# Patient Record
Sex: FEMALE | Race: WHITE | NOT HISPANIC OR LATINO | URBAN - METROPOLITAN AREA
[De-identification: names, ages, dates, MRNs, and addresses within clinical notes are randomized per-mention and may not be internally consistent; named-entity substitution may affect disease eponyms.]

---

## 2022-03-16 ENCOUNTER — APPOINTMENT (RX ONLY)
Dept: URBAN - METROPOLITAN AREA CLINIC 28 | Facility: CLINIC | Age: 37
Setting detail: DERMATOLOGY
End: 2022-03-16

## 2022-03-16 DIAGNOSIS — L40.0 PSORIASIS VULGARIS: ICD-10-CM | Status: WORSENING

## 2022-03-16 PROCEDURE — ? PRESCRIPTION MEDICATION MANAGEMENT

## 2022-03-16 PROCEDURE — ? PRESCRIPTION

## 2022-03-16 PROCEDURE — 99204 OFFICE O/P NEW MOD 45 MIN: CPT

## 2022-03-16 PROCEDURE — ? COUNSELING

## 2022-03-16 PROCEDURE — ? ADDITIONAL NOTES

## 2022-03-16 RX ORDER — HYDROCORTISONE 25 MG/G
CREAM TOPICAL BID
Qty: 30 | Refills: 3 | Status: ERX | COMMUNITY
Start: 2022-03-16

## 2022-03-16 RX ORDER — TRIAMCINOLONE ACETONIDE 1 MG/G
CREAM TOPICAL BID
Qty: 454 | Refills: 3 | Status: ERX | COMMUNITY
Start: 2022-03-16

## 2022-03-16 RX ADMIN — TRIAMCINOLONE ACETONIDE: 1 CREAM TOPICAL at 00:00

## 2022-03-16 RX ADMIN — HYDROCORTISONE: 25 CREAM TOPICAL at 00:00

## 2022-03-16 ASSESSMENT — LOCATION ZONE DERM
LOCATION ZONE: ARM
LOCATION ZONE: FACE
LOCATION ZONE: TRUNK

## 2022-03-16 ASSESSMENT — LOCATION DETAILED DESCRIPTION DERM
LOCATION DETAILED: RIGHT MEDIAL UPPER BACK
LOCATION DETAILED: LEFT INFERIOR CENTRAL MALAR CHEEK
LOCATION DETAILED: LEFT ANTERIOR DISTAL UPPER ARM

## 2022-03-16 ASSESSMENT — LOCATION SIMPLE DESCRIPTION DERM
LOCATION SIMPLE: LEFT UPPER ARM
LOCATION SIMPLE: RIGHT UPPER BACK
LOCATION SIMPLE: LEFT CHEEK

## 2022-03-16 NOTE — HPI: DRY SKIN
Is This A New Presentation Or A Follow-Up?: Dry Skin
How Severe Is Your Dry Skin?: moderate
How Many Showers Or Baths Do You Take In One Day?: 2-3

## 2022-03-16 NOTE — PROCEDURE: PRESCRIPTION MEDICATION MANAGEMENT
Initiate Treatment: triamcinolone acetonide 0.1 % topical cream: Apply to affected areas of body BID\\nhydrocortisone 2.5 % topical cream: Apply a thin layer to face BID
Render In Strict Bullet Format?: No
Detail Level: Zone

## 2023-08-07 ENCOUNTER — APPOINTMENT (RX ONLY)
Dept: URBAN - METROPOLITAN AREA CLINIC 28 | Facility: CLINIC | Age: 38
Setting detail: DERMATOLOGY
End: 2023-08-07

## 2023-08-07 DIAGNOSIS — L29.8 OTHER PRURITUS: ICD-10-CM | Status: INADEQUATELY CONTROLLED

## 2023-08-07 DIAGNOSIS — L29.89 OTHER PRURITUS: ICD-10-CM | Status: INADEQUATELY CONTROLLED

## 2023-08-07 DIAGNOSIS — L91.0 HYPERTROPHIC SCAR: ICD-10-CM | Status: INADEQUATELY CONTROLLED

## 2023-08-07 DIAGNOSIS — L85.3 XEROSIS CUTIS: ICD-10-CM | Status: INADEQUATELY CONTROLLED

## 2023-08-07 PROCEDURE — ? COUNSELING

## 2023-08-07 PROCEDURE — 99214 OFFICE O/P EST MOD 30 MIN: CPT

## 2023-08-07 PROCEDURE — ? PRESCRIPTION MEDICATION MANAGEMENT

## 2023-08-07 PROCEDURE — ? PRESCRIPTION

## 2023-08-07 RX ORDER — TRIAMCINOLONE ACETONIDE 1 MG/G
1 CREAM TOPICAL BID
Qty: 80 | Refills: 2 | Status: ERX

## 2023-08-07 RX ORDER — HYDROXYZINE HYDROCHLORIDE 10 MG/1
TABLET, FILM COATED ORAL QHS
Qty: 30 | Refills: 3 | Status: ERX | COMMUNITY
Start: 2023-08-07

## 2023-08-07 RX ORDER — CETIRIZINE HYDROCHLORIDE 10 MG/1
TABLET, FILM COATED ORAL QDAY
Qty: 30 | Refills: 3 | Status: ERX | COMMUNITY
Start: 2023-08-07

## 2023-08-07 RX ADMIN — CETIRIZINE HYDROCHLORIDE: 10 TABLET, FILM COATED ORAL at 00:00

## 2023-08-07 RX ADMIN — HYDROXYZINE HYDROCHLORIDE: 10 TABLET, FILM COATED ORAL at 00:00

## 2023-08-07 ASSESSMENT — LOCATION DETAILED DESCRIPTION DERM
LOCATION DETAILED: INFERIOR THORACIC SPINE
LOCATION DETAILED: LEFT MEDIAL SUPERIOR CHEST
LOCATION DETAILED: PERIUMBILICAL SKIN

## 2023-08-07 ASSESSMENT — LOCATION ZONE DERM: LOCATION ZONE: TRUNK

## 2023-08-07 ASSESSMENT — LOCATION SIMPLE DESCRIPTION DERM
LOCATION SIMPLE: CHEST
LOCATION SIMPLE: UPPER BACK
LOCATION SIMPLE: ABDOMEN

## 2023-08-07 NOTE — PROCEDURE: PRESCRIPTION MEDICATION MANAGEMENT
Initiate Treatment: cetirizine 10 mg tablet QDAY: Take one tablet po QDAY\\nhydroxyzine HCl 10 mg tablet QHS: Take one pill once daily at bedtime prn itch.
Plan: Pt declines doxepin as option for now, but may substitute in doxepin for hydroxyzine if needed, starting at doxepin 25mg qhs.
Render In Strict Bullet Format?: No
Detail Level: Zone
Plan: Will try topical triamcinolone cream bid prn for now
Defer Treatment (Provide Reason For Deferment In Text Field Below): ILK - pt prefers topicals for now
Initiate Treatment: triamcinolone acetonide 0.1 % topical cream BID: Apply a thin layer to AA of thigh BID until clear and then only use as needed

## 2023-11-21 ENCOUNTER — APPOINTMENT (RX ONLY)
Dept: URBAN - METROPOLITAN AREA CLINIC 28 | Facility: CLINIC | Age: 38
Setting detail: DERMATOLOGY
End: 2023-11-21

## 2023-11-21 DIAGNOSIS — L30.4 ERYTHEMA INTERTRIGO: ICD-10-CM | Status: INADEQUATELY CONTROLLED

## 2023-11-21 DIAGNOSIS — L29.89 OTHER PRURITUS: ICD-10-CM | Status: IMPROVED

## 2023-11-21 DIAGNOSIS — L29.8 OTHER PRURITUS: ICD-10-CM | Status: IMPROVED

## 2023-11-21 PROCEDURE — 99213 OFFICE O/P EST LOW 20 MIN: CPT

## 2023-11-21 PROCEDURE — ? PRESCRIPTION MEDICATION MANAGEMENT

## 2023-11-21 PROCEDURE — ? COUNSELING

## 2023-11-21 PROCEDURE — ? PRESCRIPTION

## 2023-11-21 RX ORDER — KETOCONAZOLE 20 MG/G
1 CREAM TOPICAL PRN
Qty: 60 | Refills: 2 | Status: ERX | COMMUNITY
Start: 2023-11-21

## 2023-11-21 RX ORDER — AMMONIUM LACTATE 12 G/100G
1 LOTION TOPICAL QDAY
Qty: 400 | Refills: 3 | Status: ERX | COMMUNITY
Start: 2023-11-21

## 2023-11-21 RX ORDER — HYDROCORTISONE 25 MG/G
CREAM TOPICAL
Qty: 30 | Refills: 1 | Status: ERX

## 2023-11-21 RX ADMIN — KETOCONAZOLE 1: 20 CREAM TOPICAL at 00:00

## 2023-11-21 RX ADMIN — AMMONIUM LACTATE 1: 12 LOTION TOPICAL at 00:00

## 2023-11-21 ASSESSMENT — LOCATION SIMPLE DESCRIPTION DERM
LOCATION SIMPLE: LOWER BACK
LOCATION SIMPLE: RIGHT BREAST
LOCATION SIMPLE: LEFT BREAST
LOCATION SIMPLE: UPPER BACK
LOCATION SIMPLE: CHEST

## 2023-11-21 ASSESSMENT — LOCATION DETAILED DESCRIPTION DERM
LOCATION DETAILED: LEFT MEDIAL SUPERIOR CHEST
LOCATION DETAILED: INFERIOR THORACIC SPINE
LOCATION DETAILED: SACRAL SPINE
LOCATION DETAILED: LEFT MEDIAL BREAST 7-8:00 REGION
LOCATION DETAILED: RIGHT MEDIAL BREAST 5-6:00 REGION

## 2023-11-21 ASSESSMENT — LOCATION ZONE DERM: LOCATION ZONE: TRUNK

## 2023-11-21 NOTE — PROCEDURE: PRESCRIPTION MEDICATION MANAGEMENT
Detail Level: Zone
Render In Strict Bullet Format?: No
Initiate Treatment: ketoconazole 2 % topical cream: Apply a thin layer to AA of body PRN flare\\nhydrocortisone 2.5 % topical cream: Apply a thin layer to AA of buttock Qday until clear then d/c
Initiate Treatment: ammonium lactate 12 % lotion: Apply thin layer to body qday after shower
Continue Regimen: cetirizine 10mg tablet: Take one tablet po QDAY\\nhydroxyzine HCl 10mg tablet: Take one pill once daily at bedtime prn itch.
Plan: Pt declines doxepin as option for now, but may substitute in doxepin for hydroxyzine if needed, starting at doxepin 25mg qhs.

## 2024-01-04 ENCOUNTER — RX ONLY (OUTPATIENT)
Age: 39
Setting detail: RX ONLY
End: 2024-01-04

## 2024-01-04 RX ORDER — KETOCONAZOLE 20 MG/ML
1 SHAMPOO, SUSPENSION TOPICAL
Qty: 120 | Refills: 4 | Status: ERX | COMMUNITY
Start: 2024-01-04

## 2024-01-05 ENCOUNTER — APPOINTMENT (RX ONLY)
Dept: URBAN - METROPOLITAN AREA CLINIC 28 | Facility: CLINIC | Age: 39
Setting detail: DERMATOLOGY
End: 2024-01-05

## 2024-01-05 DIAGNOSIS — L30.4 ERYTHEMA INTERTRIGO: ICD-10-CM | Status: WORSENING

## 2024-01-05 DIAGNOSIS — L21.8 OTHER SEBORRHEIC DERMATITIS: ICD-10-CM | Status: WORSENING

## 2024-01-05 PROBLEM — L30.9 DERMATITIS, UNSPECIFIED: Status: ACTIVE | Noted: 2024-01-05

## 2024-01-05 PROCEDURE — 99214 OFFICE O/P EST MOD 30 MIN: CPT

## 2024-01-05 PROCEDURE — ? COUNSELING

## 2024-01-05 PROCEDURE — ? PRESCRIPTION MEDICATION MANAGEMENT

## 2024-01-05 PROCEDURE — ? PRESCRIPTION

## 2024-01-05 RX ORDER — CLOTRIMAZOLE AND BETAMETHASONE DIPROPIONATE 10; .5 MG/G; MG/G
1 CREAM TOPICAL BID
Qty: 45 | Refills: 2 | Status: ERX | COMMUNITY
Start: 2024-01-05

## 2024-01-05 RX ORDER — CLOBETASOL PROPIONATE 0.5 MG/ML
1 SOLUTION TOPICAL QHS
Qty: 50 | Refills: 3 | Status: ERX | COMMUNITY
Start: 2024-01-05

## 2024-01-05 RX ORDER — FLUCONAZOLE 100 MG/1
1 TABLET ORAL
Qty: 7 | Refills: 0 | Status: ERX | COMMUNITY
Start: 2024-01-05

## 2024-01-05 RX ORDER — NYSTATIN 100000 [USP'U]/G
1 POWDER TOPICAL QDAY
Qty: 60 | Refills: 3 | Status: ERX | COMMUNITY
Start: 2024-01-05

## 2024-01-05 RX ADMIN — NYSTATIN 1: 100000 POWDER TOPICAL at 00:00

## 2024-01-05 RX ADMIN — CLOBETASOL PROPIONATE 1: 0.5 SOLUTION TOPICAL at 00:00

## 2024-01-05 RX ADMIN — CLOTRIMAZOLE AND BETAMETHASONE DIPROPIONATE 1: 10; .5 CREAM TOPICAL at 00:00

## 2024-01-05 RX ADMIN — FLUCONAZOLE 1: 100 TABLET ORAL at 00:00

## 2024-01-05 ASSESSMENT — LOCATION DETAILED DESCRIPTION DERM
LOCATION DETAILED: LEFT SUPERIOR PARIETAL SCALP
LOCATION DETAILED: RIGHT CYMBA CONCHA
LOCATION DETAILED: SACRAL SPINE
LOCATION DETAILED: RIGHT MEDIAL BREAST 5-6:00 REGION
LOCATION DETAILED: LEFT MEDIAL BREAST 7-8:00 REGION

## 2024-01-05 ASSESSMENT — LOCATION SIMPLE DESCRIPTION DERM
LOCATION SIMPLE: RIGHT BREAST
LOCATION SIMPLE: LOWER BACK
LOCATION SIMPLE: LEFT BREAST
LOCATION SIMPLE: SCALP
LOCATION SIMPLE: RIGHT EAR

## 2024-01-05 ASSESSMENT — LOCATION ZONE DERM
LOCATION ZONE: TRUNK
LOCATION ZONE: SCALP
LOCATION ZONE: EAR

## 2024-01-05 NOTE — PROCEDURE: PRESCRIPTION MEDICATION MANAGEMENT
Detail Level: Zone
Render In Strict Bullet Format?: No
Initiate Treatment: nystatin 100,000 unit/gram topical powder: Apply QDAY to AA under breasts\\nclotrimazole-betamethasone 1 %-0.05 % topical cream: Apply a thin layer to AA of breast and buttock  BID until clear
Discontinue Regimen: ketoconazole 2 % topical cream: Apply a thin layer to AA of body PRN flare\\nhydrocortisone 2.5 % topical cream: Apply a thin layer to AA of buttock Qday until clear then d/c
Initiate Treatment: clobetasol 0.05 % scalp solution: apply scattered drops to scalp QHS PRN itch\\nfluconazole 100 mg tablet: Take 1 pill PO QDAY X 7 days

## 2024-02-20 ENCOUNTER — APPOINTMENT (RX ONLY)
Dept: URBAN - METROPOLITAN AREA CLINIC 28 | Facility: CLINIC | Age: 39
Setting detail: DERMATOLOGY
End: 2024-02-20

## 2024-02-20 DIAGNOSIS — L21.8 OTHER SEBORRHEIC DERMATITIS: ICD-10-CM | Status: IMPROVED

## 2024-02-20 DIAGNOSIS — L30.4 ERYTHEMA INTERTRIGO: ICD-10-CM | Status: WORSENING

## 2024-02-20 PROBLEM — L30.9 DERMATITIS, UNSPECIFIED: Status: ACTIVE | Noted: 2024-02-20

## 2024-02-20 PROCEDURE — ? COUNSELING

## 2024-02-20 PROCEDURE — ? PRESCRIPTION MEDICATION MANAGEMENT

## 2024-02-20 PROCEDURE — ? PRESCRIPTION SAMPLES PROVIDED

## 2024-02-20 PROCEDURE — ? PHOTO-DOCUMENTATION

## 2024-02-20 PROCEDURE — ? PRESCRIPTION

## 2024-02-20 PROCEDURE — 99213 OFFICE O/P EST LOW 20 MIN: CPT

## 2024-02-20 RX ORDER — CALCIPOTRIENE 0.05 MG/G
CREAM TOPICAL BID
Qty: 120 | Refills: 3 | Status: ERX | COMMUNITY
Start: 2024-02-20

## 2024-02-20 RX ADMIN — CALCIPOTRIENE: 0.05 CREAM TOPICAL at 00:00

## 2024-02-20 ASSESSMENT — LOCATION SIMPLE DESCRIPTION DERM
LOCATION SIMPLE: LEFT BREAST
LOCATION SIMPLE: RIGHT EAR
LOCATION SIMPLE: LOWER BACK
LOCATION SIMPLE: SCALP
LOCATION SIMPLE: RIGHT BREAST

## 2024-02-20 ASSESSMENT — LOCATION DETAILED DESCRIPTION DERM
LOCATION DETAILED: LEFT MEDIAL BREAST 7-8:00 REGION
LOCATION DETAILED: RIGHT MEDIAL BREAST 5-6:00 REGION
LOCATION DETAILED: LEFT SUPERIOR PARIETAL SCALP
LOCATION DETAILED: SACRAL SPINE
LOCATION DETAILED: RIGHT CYMBA CONCHA

## 2024-02-20 ASSESSMENT — LOCATION ZONE DERM
LOCATION ZONE: EAR
LOCATION ZONE: SCALP
LOCATION ZONE: TRUNK

## 2024-02-20 NOTE — PROCEDURE: PRESCRIPTION MEDICATION MANAGEMENT
Detail Level: Zone
Render In Strict Bullet Format?: No
Initiate Treatment: calcipotriene 0.005% topical cream: Apply a thin layer BID to AA of chest, abdomen and buttock\\nZoryve cream: apply thin layer to aa of trunk qday until clear then d/c
Discontinue Regimen: clotrimazole-betamethasone 1 %-0.05 % topical cream: Apply a thin layer to AA of breast and buttock  BID until clear\\nnystatin 100,000 unit/gram topical powder: Apply QDAY to AA under breasts and buttock area
Discontinue Regimen: fluconazole 100 mg tablet: Take 1 pill PO QDAY X 7 days
Continue Regimen: clobetasol 0.05 % scalp solution: apply scattered drops to scalp QHS PRN itch

## 2024-05-17 ENCOUNTER — RX ONLY (OUTPATIENT)
Age: 39
Setting detail: RX ONLY
End: 2024-05-17

## 2024-05-17 ENCOUNTER — APPOINTMENT (RX ONLY)
Dept: URBAN - METROPOLITAN AREA CLINIC 28 | Facility: CLINIC | Age: 39
Setting detail: DERMATOLOGY
End: 2024-05-17

## 2024-05-17 DIAGNOSIS — L30.4 ERYTHEMA INTERTRIGO: ICD-10-CM

## 2024-05-17 DIAGNOSIS — L40.8 OTHER PSORIASIS: ICD-10-CM | Status: IMPROVED

## 2024-05-17 PROBLEM — L30.9 DERMATITIS, UNSPECIFIED: Status: ACTIVE | Noted: 2024-05-17

## 2024-05-17 PROCEDURE — 11104 PUNCH BX SKIN SINGLE LESION: CPT

## 2024-05-17 PROCEDURE — ? PRESCRIPTION MEDICATION MANAGEMENT

## 2024-05-17 PROCEDURE — ? BIOPSY BY PUNCH METHOD

## 2024-05-17 PROCEDURE — ? COUNSELING

## 2024-05-17 PROCEDURE — 99213 OFFICE O/P EST LOW 20 MIN: CPT | Mod: 25

## 2024-05-17 PROCEDURE — ? PHOTO-DOCUMENTATION

## 2024-05-17 RX ORDER — KETOCONAZOLE 20 MG/ML
1 SHAMPOO, SUSPENSION TOPICAL
Qty: 120 | Refills: 4 | Status: ERX | COMMUNITY
Start: 2024-05-17

## 2024-05-17 RX ORDER — CETIRIZINE HYDROCHLORIDE 10 MG/1
1 TABLET, FILM COATED ORAL QDAY
Qty: 30 | Refills: 0 | Status: ERX | COMMUNITY
Start: 2024-05-17

## 2024-05-17 RX ORDER — HYDROXYZINE HYDROCHLORIDE 10 MG/1
1 TABLET, FILM COATED ORAL QHS
Qty: 30 | Refills: 0 | Status: ERX | COMMUNITY
Start: 2024-05-17

## 2024-05-17 ASSESSMENT — LOCATION ZONE DERM
LOCATION ZONE: SCALP
LOCATION ZONE: TRUNK
LOCATION ZONE: EAR

## 2024-05-17 ASSESSMENT — LOCATION SIMPLE DESCRIPTION DERM
LOCATION SIMPLE: LOWER BACK
LOCATION SIMPLE: RIGHT EAR
LOCATION SIMPLE: GLUTEAL CLEFT
LOCATION SIMPLE: SCALP

## 2024-05-17 ASSESSMENT — LOCATION DETAILED DESCRIPTION DERM
LOCATION DETAILED: RIGHT CYMBA CONCHA
LOCATION DETAILED: LEFT SUPERIOR PARIETAL SCALP
LOCATION DETAILED: SACRAL SPINE
LOCATION DETAILED: GLUTEAL CLEFT

## 2024-05-17 ASSESSMENT — SEVERITY ASSESSMENT: HOW SEVERE IS THIS PATIENT'S CONDITION?: MILD

## 2024-05-17 ASSESSMENT — BSA PSORIASIS: % BODY COVERED IN PSORIASIS: 1

## 2024-05-17 NOTE — PROCEDURE: PRESCRIPTION MEDICATION MANAGEMENT
Detail Level: Zone
Render In Strict Bullet Format?: No
Continue Regimen: calcipotriene 0.005% topical cream: Apply a thin layer BID to AA of chest, abdomen and buttock
Continue Regimen: clobetasol 0.05% scalp solution: apply scattered drops to scalp QHS PRN itch\\nKetoconazole 2% shampoo: use to shampoo scalp weekly, use to wash face daily\\nHydroxyzine 10mg tablet: take 1 tab po PRN at night